# Patient Record
Sex: FEMALE | Race: WHITE
[De-identification: names, ages, dates, MRNs, and addresses within clinical notes are randomized per-mention and may not be internally consistent; named-entity substitution may affect disease eponyms.]

---

## 2017-10-19 NOTE — WOMENS IMAGING REPORT
EXAM DESCRIPTION:  BILAT SCREENING MAMMO W/CAD



COMPLETED DATE/TIME:  10/19/2017 10:06 am



REASON FOR STUDY:  ROUTINE SCREENING; Z12.31 Z12.31  ENCNTR SCREEN MAMMOGRAM FOR MALIGNANT NEOPLASM O
F NELLY



COMPARISON:  None.



TECHNIQUE:  Standard craniocaudal and mediolateral oblique views of each breast recorded using [employer]
l acquisition.



LIMITATIONS:  None.



FINDINGS:  No masses, calcifications or architectural distortion. No areas of suspicion.

Read with the assistance of CAD.

.[facility] - R2 Cenova Version 1.3

.[facility] - R2 Cenova Version 1.3

.[facility] - R2 Cenova Version 2.4

.[facility] - R2 Cenova Version 2.4

.[facility] - R2  Version 9.2



IMPRESSION:  NORMAL MAMMOGRAM.  BIRADS 1.



BREAST DENSITY:  b. There are scattered areas of fibroglandular density.



BIRAD:  1 NEGATIVE



RECOMMENDATION:  ROUTINE SCREENING



COMMENT:  The patient has been notified of the results by letter per SA requirements. Additional no
tification policies are in place for contacting patient with suspicious or incomplete findings.

Quality ID #[id number]: The American College of Radiology recommends an annual screening mammogram for women
 aged 40 years or over. This facility utilizes a reminder system to ensure that all patients receive 
reminder letters, and/or direct phone calls for appointments. This includes reminders for routine scr
eening mammograms, diagnostic mammograms, or other Breast Imaging Interventions when appropriate.  Th
is patient will be placed in the appropriate reminder system.

The American College of Radiology (ACR) has developed recommendations for screening MRI of the breast
s in certain patient populations, to be used in conjunction with mammography.  Breast MRI surveillanc
e may be appropriate for women with more than 20% lifetime risk of developing breast cancer  as deter
mined by genetic testing, significant family history of the disease, or history of mantle radiation f
or Hodgkins Disease.  ACR Practice Guidelines 2008.



TECHNICAL DOCUMENTATION:  FINDING NUMBER: (1)

ASSESSMENT: (1)

JOB ID:  [id number]

 2011 [employer]- All Rights Reserved

## 2017-12-15 ENCOUNTER — HOSPITAL ENCOUNTER (OUTPATIENT)
Dept: HOSPITAL 62 - OD | Age: 67
End: 2017-12-15
Attending: NURSE PRACTITIONER
Payer: MEDICARE

## 2017-12-15 DIAGNOSIS — R05: ICD-10-CM

## 2017-12-15 DIAGNOSIS — I10: Primary | ICD-10-CM

## 2017-12-15 DIAGNOSIS — R74.8: ICD-10-CM

## 2017-12-15 DIAGNOSIS — J01.41: ICD-10-CM

## 2017-12-15 LAB
ALBUMIN SERPL-MCNC: 4.7 G/DL (ref 3.5–5)
ALP SERPL-CCNC: 59 U/L (ref 38–126)
ALT SERPL-CCNC: 96 U/L (ref 9–52)
ANION GAP SERPL CALC-SCNC: 15 MMOL/L (ref 5–19)
AST SERPL-CCNC: 77 U/L (ref 14–36)
BASOPHILS # BLD AUTO: 0.1 10^3/UL (ref 0–0.2)
BASOPHILS NFR BLD AUTO: 1 % (ref 0–2)
BILIRUB DIRECT SERPL-MCNC: 0.2 MG/DL (ref 0–0.4)
BILIRUB SERPL-MCNC: 0.2 MG/DL (ref 0.2–1.3)
BUN SERPL-MCNC: 17 MG/DL (ref 7–20)
CALCIUM: 9.8 MG/DL (ref 8.4–10.2)
CHLORIDE SERPL-SCNC: 102 MMOL/L (ref 98–107)
CO2 SERPL-SCNC: 28 MMOL/L (ref 22–30)
CREAT SERPL-MCNC: 0.97 MG/DL (ref 0.52–1.25)
EOSINOPHIL # BLD AUTO: 0.4 10^3/UL (ref 0–0.6)
EOSINOPHIL NFR BLD AUTO: 5 % (ref 0–6)
ERYTHROCYTE [DISTWIDTH] IN BLOOD BY AUTOMATED COUNT: 17.1 % (ref 11.5–14)
GLUCOSE SERPL-MCNC: 76 MG/DL (ref 75–110)
HCT VFR BLD CALC: 36.9 % (ref 36–47)
HGB BLD-MCNC: 11.9 G/DL (ref 12–15.5)
HGB HCT DIFFERENCE: -1.2
LYMPHOCYTES # BLD AUTO: 2.1 10^3/UL (ref 0.5–4.7)
LYMPHOCYTES NFR BLD AUTO: 26.3 % (ref 13–45)
MCH RBC QN AUTO: 23.6 PG (ref 27–33.4)
MCHC RBC AUTO-ENTMCNC: 32.2 G/DL (ref 32–36)
MCV RBC AUTO: 73 FL (ref 80–97)
MONOCYTES # BLD AUTO: 0.6 10^3/UL (ref 0.1–1.4)
MONOCYTES NFR BLD AUTO: 7.2 % (ref 3–13)
NEUTROPHILS # BLD AUTO: 4.8 10^3/UL (ref 1.7–8.2)
NEUTS SEG NFR BLD AUTO: 60.5 % (ref 42–78)
POTASSIUM SERPL-SCNC: 5.2 MMOL/L (ref 3.6–5)
PROT SERPL-MCNC: 7.2 G/DL (ref 6.3–8.2)
RBC # BLD AUTO: 5.02 10^6/UL (ref 3.72–5.28)
SODIUM SERPL-SCNC: 145.1 MMOL/L (ref 137–145)
WBC # BLD AUTO: 7.9 10^3/UL (ref 4–10.5)

## 2017-12-15 PROCEDURE — 83036 HEMOGLOBIN GLYCOSYLATED A1C: CPT

## 2017-12-15 PROCEDURE — 80053 COMPREHEN METABOLIC PANEL: CPT

## 2017-12-15 PROCEDURE — 85025 COMPLETE CBC W/AUTO DIFF WBC: CPT

## 2017-12-15 PROCEDURE — 36415 COLL VENOUS BLD VENIPUNCTURE: CPT

## 2017-12-15 PROCEDURE — 71020: CPT

## 2017-12-15 NOTE — RADIOLOGY REPORT (SQ)
EXAM DESCRIPTION:  CHEST PA/LATERAL



COMPLETED DATE/TIME:  12/15/2017 10:24 am



REASON FOR STUDY:  COUGH



COMPARISON:  None.



EXAM PARAMETERS:  NUMBER OF VIEWS: two views

TECHNIQUE: Digital Frontal and Lateral radiographic views of the chest acquired.

RADIATION DOSE: NA

LIMITATIONS: none



FINDINGS:  LUNGS AND PLEURA: No opacities, masses or pneumothorax. No pleural effusion.

MEDIASTINUM AND HILAR STRUCTURES: No masses or contour abnormalities.

HEART AND VASCULAR STRUCTURES: Heart normal size.  No evidence for failure.

BONES: No acute findings.

HARDWARE: None in the chest.

OTHER: No other significant finding.



IMPRESSION:  NO SIGNIFICANT RADIOGRAPHIC FINDING IN THE CHEST.



TECHNICAL DOCUMENTATION:  JOB ID:  [id number]

 2011 [employer]- All Rights Reserved

## 2018-11-19 ENCOUNTER — HOSPITAL ENCOUNTER (OUTPATIENT)
Dept: HOSPITAL 62 - WI | Age: 68
End: 2018-11-19
Attending: NURSE PRACTITIONER
Payer: MEDICARE

## 2018-11-19 DIAGNOSIS — M85.88: ICD-10-CM

## 2018-11-19 DIAGNOSIS — Z78.0: Primary | ICD-10-CM

## 2018-11-19 PROCEDURE — 77080 DXA BONE DENSITY AXIAL: CPT

## 2018-11-19 NOTE — WOMENS IMAGING REPORT
EXAM DESCRIPTION:  BONE DENSITY HIP/SPINE



COMPLETED DATE/TIME:  11/19/2018 9:50 am



REASON FOR STUDY:  BONE DENSITY TEST/Z78.0 Z78.0  ASYMPTOMATIC MENOPAUSAL STATE



COMPARISON:   None.



TECHNIQUE:  Dual-Energy X-ray Absorptiometry (DEXA) of the AP Spine and Hip.



LIMITATIONS:  None.



FINDINGS:  LUMBAR SPINE:

The bone mineral density (BMD) measured from L1-L4 in the AP projection correlates with a T-score of 
-0.9, which is normal as defined by the World Health Organization.

HIP:

The bone mineral density (BMD) measured in the left hip correlates with a T-score of -1.8, which is o
steopenia as defined by the World Health Organization.



IMPRESSION:  1. LUMBAR SPINE: Normal

2.  HIP: Osteopenia



COMMENT:  The World Health Organization defines low BMD as follows:

T-score:

Normal:  Greater than -1.0

Osteopenia: Between -1.0 and -2.5

Osteoporosis:  Less than -2.5 without fractures

Established osteoporosis:  Less than -2.5 with fractures

In general, you may wish to consider:

Diagnosis          Treatment                     Follow-up DEXA

Normal BMD      Prevention                    2-3 years

Osteopenia       Prevention/Therapy        1-2 years

Osteoporosis     Therapy                        Yearly



TECHNICAL DOCUMENTATION:  JOB ID:  [id number]

 2011 [employer]- All Rights Reserved



Reading location - IP/workstation name: [email or web address]

## 2020-03-17 ENCOUNTER — HOSPITAL ENCOUNTER (OUTPATIENT)
Dept: HOSPITAL 62 - SC | Age: 70
Discharge: HOME | End: 2020-03-17
Attending: OPHTHALMOLOGY
Payer: MEDICARE

## 2020-03-17 DIAGNOSIS — E78.00: ICD-10-CM

## 2020-03-17 DIAGNOSIS — Z79.899: ICD-10-CM

## 2020-03-17 DIAGNOSIS — I11.9: ICD-10-CM

## 2020-03-17 DIAGNOSIS — E03.9: ICD-10-CM

## 2020-03-17 DIAGNOSIS — H25.813: Primary | ICD-10-CM

## 2020-03-17 DIAGNOSIS — R00.2: ICD-10-CM

## 2020-03-17 DIAGNOSIS — H52.4: ICD-10-CM

## 2020-03-17 DIAGNOSIS — Z88.5: ICD-10-CM

## 2020-03-17 DIAGNOSIS — H04.123: ICD-10-CM

## 2020-03-17 DIAGNOSIS — Z86.711: ICD-10-CM

## 2020-03-17 DIAGNOSIS — E11.9: ICD-10-CM

## 2020-03-17 DIAGNOSIS — Z79.01: ICD-10-CM

## 2020-03-17 DIAGNOSIS — Z79.84: ICD-10-CM

## 2020-03-17 DIAGNOSIS — Z79.82: ICD-10-CM

## 2020-03-17 PROCEDURE — V2632 POST CHMBR INTRAOCULAR LENS: HCPCS

## 2020-03-17 PROCEDURE — 82962 GLUCOSE BLOOD TEST: CPT

## 2020-03-17 PROCEDURE — 66984 XCAPSL CTRC RMVL W/O ECP: CPT

## 2020-03-17 PROCEDURE — 00142 ANES PX ON EYE LENS SURGERY: CPT

## 2020-03-17 RX ADMIN — DORZOLAMIDE HYDROCHLORIDE AND TIMOLOL MALEATE PRN DROP: 20; 5 SOLUTION OPHTHALMIC at 10:37

## 2020-03-17 RX ADMIN — CYCLOPENTOLATE HYDROCHLORIDE AND PHENYLEPHRINE HYDROCHLORIDE PRN DROP: 2; 10 SOLUTION/ DROPS OPHTHALMIC at 09:47

## 2020-03-17 RX ADMIN — TETRACAINE HYDROCHLORIDE PRN DROP: 5 SOLUTION OPHTHALMIC at 09:47

## 2020-03-17 RX ADMIN — TETRACAINE HYDROCHLORIDE PRN DROP: 5 SOLUTION OPHTHALMIC at 10:14

## 2020-03-17 RX ADMIN — BESIFLOXACIN PRN DROP: 6 SUSPENSION OPHTHALMIC at 10:37

## 2020-03-17 RX ADMIN — CYCLOPENTOLATE HYDROCHLORIDE AND PHENYLEPHRINE HYDROCHLORIDE PRN DROP: 2; 10 SOLUTION/ DROPS OPHTHALMIC at 09:57

## 2020-03-17 RX ADMIN — TETRACAINE HYDROCHLORIDE PRN DROP: 5 SOLUTION OPHTHALMIC at 10:07

## 2020-03-17 RX ADMIN — BESIFLOXACIN PRN DROP: 6 SUSPENSION OPHTHALMIC at 09:48

## 2020-03-17 RX ADMIN — BESIFLOXACIN PRN DROP: 6 SUSPENSION OPHTHALMIC at 00:00

## 2020-03-17 RX ADMIN — CYCLOPENTOLATE HYDROCHLORIDE AND PHENYLEPHRINE HYDROCHLORIDE PRN DROP: 2; 10 SOLUTION/ DROPS OPHTHALMIC at 10:06

## 2020-03-17 RX ADMIN — TROPICAMIDE PRN DROP: 10 SOLUTION/ DROPS OPHTHALMIC at 10:07

## 2020-03-17 RX ADMIN — BESIFLOXACIN PRN DROP: 6 SUSPENSION OPHTHALMIC at 09:57

## 2020-03-17 RX ADMIN — DORZOLAMIDE HYDROCHLORIDE AND TIMOLOL MALEATE PRN DROP: 20; 5 SOLUTION OPHTHALMIC at 00:00

## 2020-03-17 RX ADMIN — TROPICAMIDE PRN DROP: 10 SOLUTION/ DROPS OPHTHALMIC at 09:47

## 2020-03-17 RX ADMIN — TROPICAMIDE PRN DROP: 10 SOLUTION/ DROPS OPHTHALMIC at 09:57

## 2020-03-17 NOTE — OPERATIVE REPORT
Operative Report[facility]


Operative Report: 





DATE OF SURGERY: 3/17/2020


PREOPERATIVE DIAGNOSIS: CATARACT, RIGHT EYE.


POSTOPERATIVE DIAGNOSIS: CATARACT, RIGHT EYE.


PROCEDURE PERFORMED:


PHACOEMULSIFICATION WITH POSTERIOR CHAMBER INTRAOCULAR LENS, RIGHT EYE.


Intraocular Lens Model : SN 60 WF 21.0


Total Phaco Time: 7.48 CDE


SURGEON: NORMAN LOPEZ MD


ANESTHESIA: TOPICAL WITH MAC.


INDICATIONS FOR SURGERY: Difficulty reading road signs and words on TV.


PROCEDURE:


The patient was brought to the Operating Room and placed on the operative table.

Following tetracaine drops, topical anesthesia was administered. This consisted 

of instrument wipe pledgets soaked in a solution of 4% Xylocaine mixed with 

0.75% Marcaine in a 1:2 ratio. A 2 x 1 cm pledget was placed in the superior 

fornix. A 1 x 1 cm pledget was placed in the inferior fornix. The eye was 

patched shut for 5 minutes. The patch was removed. The eye was sterilely prepped

and draped in the usual manner. Lid speculum was placed in the eye. The pledgets

were removed. 4-0 black silk sutures were placed around the superior and the 

inferior rectus muscles to be used as traction. A conjunctival peritomy was made

at the 10 o'clock position. Hemostasis was obtained with bipolar cautery. A 

posterior limbal groove was created using a crescent knife and dissected 

anteriorly towards the cornea. A sharp point blade was used to create a 

paracentesis site at the 2 o'clock position. 0.2 cc non preserved Lidocaine was 

injected into the anterior chamber. A 2.4 mm keratome was used to enter the 

anterior chamber through the groove. Viscoelastic was injected into the anterior

chamber. An anterior capsulotomy was performed using Utrata forceps in a 

capsulorrhexis fashion. Hydrodissection and hydrodelineation were performed. 

Phacoemulsification was performed in divide-and-conquer technique.


Following this, the I/A unit was used to remove residual cortex. Viscoelastic 

was injected into the capsular bag. The Intraocular lens was placed in the 

capsular bag. The I/A unit was used to remove residual viscoelastic. The wound 

was seen to be watertight under high and low pressure, and no sutures were 

placed. The intraocular lens was well centered. The pressure was adjusted in the

eye to normal pressure. The 4-0 black silk sutures and lid speculum were 

removed. The eye was shielded after Besivance. prednisolone, and Cosopt drops 

were placed. The patient tolerated the procedure well and was sent to the 

Recovery Room in good condition.

## 2020-05-19 ENCOUNTER — HOSPITAL ENCOUNTER (OUTPATIENT)
Dept: HOSPITAL 62 - SC | Age: 70
Discharge: HOME | End: 2020-05-19
Attending: OPHTHALMOLOGY
Payer: MEDICARE

## 2020-05-19 DIAGNOSIS — H25.812: Primary | ICD-10-CM

## 2020-05-19 DIAGNOSIS — G47.33: ICD-10-CM

## 2020-05-19 DIAGNOSIS — E03.9: ICD-10-CM

## 2020-05-19 DIAGNOSIS — Z79.84: ICD-10-CM

## 2020-05-19 DIAGNOSIS — Z09: ICD-10-CM

## 2020-05-19 DIAGNOSIS — Z79.82: ICD-10-CM

## 2020-05-19 DIAGNOSIS — E11.9: ICD-10-CM

## 2020-05-19 DIAGNOSIS — Z86.711: ICD-10-CM

## 2020-05-19 DIAGNOSIS — D64.9: ICD-10-CM

## 2020-05-19 DIAGNOSIS — E78.00: ICD-10-CM

## 2020-05-19 DIAGNOSIS — R00.2: ICD-10-CM

## 2020-05-19 DIAGNOSIS — Z79.899: ICD-10-CM

## 2020-05-19 DIAGNOSIS — Z96.1: ICD-10-CM

## 2020-05-19 DIAGNOSIS — Z88.5: ICD-10-CM

## 2020-05-19 PROCEDURE — 82962 GLUCOSE BLOOD TEST: CPT

## 2020-05-19 PROCEDURE — V2632 POST CHMBR INTRAOCULAR LENS: HCPCS

## 2020-05-19 PROCEDURE — 66984 XCAPSL CTRC RMVL W/O ECP: CPT

## 2020-05-19 RX ADMIN — CYCLOPENTOLATE HYDROCHLORIDE AND PHENYLEPHRINE HYDROCHLORIDE PRN DROP: 2; 10 SOLUTION/ DROPS OPHTHALMIC at 06:50

## 2020-05-19 RX ADMIN — TETRACAINE HYDROCHLORIDE PRN DROP: 5 SOLUTION OPHTHALMIC at 06:49

## 2020-05-19 RX ADMIN — TROPICAMIDE PRN DROP: 10 SOLUTION/ DROPS OPHTHALMIC at 07:11

## 2020-05-19 RX ADMIN — CYCLOPENTOLATE HYDROCHLORIDE AND PHENYLEPHRINE HYDROCHLORIDE PRN DROP: 2; 10 SOLUTION/ DROPS OPHTHALMIC at 07:00

## 2020-05-19 RX ADMIN — TROPICAMIDE PRN DROP: 10 SOLUTION/ DROPS OPHTHALMIC at 07:00

## 2020-05-19 RX ADMIN — BESIFLOXACIN PRN DROP: 6 SUSPENSION OPHTHALMIC at 07:12

## 2020-05-19 RX ADMIN — BESIFLOXACIN PRN DROP: 6 SUSPENSION OPHTHALMIC at 06:50

## 2020-05-19 RX ADMIN — TROPICAMIDE PRN DROP: 10 SOLUTION/ DROPS OPHTHALMIC at 06:49

## 2020-05-19 RX ADMIN — CYCLOPENTOLATE HYDROCHLORIDE AND PHENYLEPHRINE HYDROCHLORIDE PRN DROP: 2; 10 SOLUTION/ DROPS OPHTHALMIC at 07:11

## 2020-05-19 RX ADMIN — BESIFLOXACIN PRN DROP: 6 SUSPENSION OPHTHALMIC at 07:58

## 2020-05-19 RX ADMIN — TETRACAINE HYDROCHLORIDE PRN DROP: 5 SOLUTION OPHTHALMIC at 07:12

## 2020-05-19 NOTE — OPERATIVE REPORT
Operative Report[facility]


Operative Report: 





DATE OF SURGERY: 5/19/2020


PREOPERATIVE DIAGNOSIS: CATARACT, LEFT EYE.


POSTOPERATIVE DIAGNOSIS: CATARACT, LEFT EYE.


PROCEDURE PERFORMED: PHACOEMULSIFICATION WITH POSTERIOR CHAMBER INTRAOCULAR 

LENS, LEFT EYE.


Intraocular Lens Model : SN 60 WF 21.0


Total Phaco Time: 4.44 CDE


SURGEON: NORMAN LOPEZ MD


ANESTHESIA: TOPICAL WITH MAC.


INDICATIONS FOR SURGERY: Difficultly driving at night


PROCEDURE:


The patient was brought to the Operating Room and placed on the operative table.

Following tetracaine drops, topical anesthesia was administered. This consisted 

of instrument wipe pledgets soaked in a solution of 4% Xylocaine mixed with 

0.75% Marcaine in a 1:2 ratio. A 2 x 1 cm pledget was placed in the superior 

fornix. A 1 x 1 cm pledget was placed in the inferior fornix. The eye was 

patched shut for 5 minutes. The patch was removed. The eye was sterilely prepped

and draped in the usual manner. Lid speculum was placed in the eye. The pledgets

were removed. 4-0 black silk sutures were placed around the superior and the 

inferior rectus muscles to be used as traction. A conjunctival peritomy was made

at the 10 o'clock position. Hemostasis was obtained with bipolar cautery. A 

posterior limbal groove was created using a crescent knife and dissected 

anteriorly towards the cornea. A sharp point blade was used to create a 

paracentesis site at the 2 o'clock position. 0.2 cc non preserved Lidocaine was 

injected into the anterior chamber. A 2.4 mm keratome was used to enter the 

anterior chamber through the groove. Viscoelastic was injected into the anterior

chamber. An anterior capsulotomy was performed using Utrata forceps in a 

capsulorrhexis fashion. Hydrodissection and hydrodelineation were performed. 

Phacoemulsification was performed in divide-and-conquer technique.


Following this, the I/A unit was used to remove residual cortex. Viscoelastic 

was injected into the capsular bag. The Intraocular lens was placed in the 

capsular bag. The I/A unit was used to remove residual viscoelastic. The wound 

was seen to be watertight under high and low pressure, and no sutures were 

placed. The intraocular lens was well centered. The pressure was adjusted in the

eye to normal pressure. The 4-0 black silk sutures and lid speculum were 

removed. The eye was shielded after Besivance,prednisolone, and Cosopt drops 

were placed. The patient tolerated the procedure well and was sent to the 

Recovery Room in good condition.